# Patient Record
Sex: MALE | Race: WHITE | NOT HISPANIC OR LATINO | RURAL
[De-identification: names, ages, dates, MRNs, and addresses within clinical notes are randomized per-mention and may not be internally consistent; named-entity substitution may affect disease eponyms.]

---

## 2023-06-16 ENCOUNTER — OFFICE VISIT (OUTPATIENT)
Dept: RURAL CLINIC 2 | Facility: CLINIC | Age: 68
End: 2023-06-16

## 2023-12-08 ENCOUNTER — OFFICE VISIT (OUTPATIENT)
Dept: RURAL CLINIC 2 | Facility: CLINIC | Age: 68
End: 2023-12-08

## 2024-01-09 ENCOUNTER — OFFICE VISIT (OUTPATIENT)
Dept: RURAL CLINIC 2 | Facility: CLINIC | Age: 69
End: 2024-01-09
Payer: MEDICARE

## 2024-01-09 ENCOUNTER — LAB OUTSIDE AN ENCOUNTER (OUTPATIENT)
Dept: RURAL CLINIC 2 | Facility: CLINIC | Age: 69
End: 2024-01-09

## 2024-01-09 ENCOUNTER — DASHBOARD ENCOUNTERS (OUTPATIENT)
Age: 69
End: 2024-01-09

## 2024-01-09 VITALS
HEIGHT: 77 IN | HEART RATE: 88 BPM | BODY MASS INDEX: 25.62 KG/M2 | SYSTOLIC BLOOD PRESSURE: 137 MMHG | TEMPERATURE: 98 F | DIASTOLIC BLOOD PRESSURE: 87 MMHG | WEIGHT: 217 LBS

## 2024-01-09 DIAGNOSIS — E78.5 BORDERLINE HYPERLIPIDEMIA: ICD-10-CM

## 2024-01-09 DIAGNOSIS — K57.90 DIVERTICULOSIS: ICD-10-CM

## 2024-01-09 DIAGNOSIS — Z12.11 SCREENING FOR MALIGNANT NEOPLASM OF COLON: ICD-10-CM

## 2024-01-09 PROBLEM — 55822004: Status: ACTIVE | Noted: 2024-01-09

## 2024-01-09 PROBLEM — 305058001: Status: ACTIVE | Noted: 2024-01-09

## 2024-01-09 PROBLEM — 397881000: Status: ACTIVE | Noted: 2024-01-09

## 2024-01-09 PROCEDURE — 99202 OFFICE O/P NEW SF 15 MIN: CPT | Performed by: NURSE PRACTITIONER

## 2024-01-09 RX ORDER — SODIUM, POTASSIUM,MAG SULFATES 17.5-3.13G
354 ML SOLUTION, RECONSTITUTED, ORAL ORAL 2
Qty: 1 | Refills: 0 | OUTPATIENT
Start: 2024-01-09 | End: 2024-01-10

## 2024-01-09 NOTE — HPI-ZZZTODAY'S VISIT
The patient is a 68-year-old gentleman self-referred for the evaluation of a screening colonoscopy.  His previous colonoscopy was completed 12 years ago and denies a history of colon polyps.  Family history is negative for colon cancer.  He is currently feeling well and denies abdominal pain, constipation, diarrhea or rectal bleeding.  Past medical history of diverticulosis and hyperlipidemia.

## 2024-03-26 ENCOUNTER — COLON (OUTPATIENT)
Dept: RURAL MEDICAL CENTER 4 | Facility: MEDICAL CENTER | Age: 69
End: 2024-03-26